# Patient Record
Sex: MALE | Employment: FULL TIME
[De-identification: names, ages, dates, MRNs, and addresses within clinical notes are randomized per-mention and may not be internally consistent; named-entity substitution may affect disease eponyms.]

---

## 2024-07-09 ENCOUNTER — HOSPITAL ENCOUNTER (OUTPATIENT)
Facility: HOSPITAL | Age: 54
Discharge: HOME | End: 2024-07-09
Attending: PODIATRIST
Payer: COMMERCIAL

## 2024-07-09 VITALS
WEIGHT: 231 LBS | BODY MASS INDEX: 31.29 KG/M2 | HEART RATE: 88 BPM | TEMPERATURE: 97.9 F | DIASTOLIC BLOOD PRESSURE: 72 MMHG | SYSTOLIC BLOOD PRESSURE: 153 MMHG | RESPIRATION RATE: 17 BRPM | HEIGHT: 72 IN

## 2024-07-09 DIAGNOSIS — L97.522 ULCER OF LEFT FOOT, WITH FAT LAYER EXPOSED (HCC): Primary | ICD-10-CM

## 2024-07-09 PROCEDURE — 11042 DBRDMT SUBQ TIS 1ST 20SQCM/<: CPT

## 2024-07-09 PROCEDURE — 99203 OFFICE O/P NEW LOW 30 MIN: CPT

## 2024-07-09 RX ORDER — BACITRACIN ZINC AND POLYMYXIN B SULFATE 500; 1000 [USP'U]/G; [USP'U]/G
OINTMENT TOPICAL ONCE
OUTPATIENT
Start: 2024-07-09 | End: 2024-07-09

## 2024-07-09 RX ORDER — GENTAMICIN SULFATE 1 MG/G
OINTMENT TOPICAL ONCE
OUTPATIENT
Start: 2024-07-09 | End: 2024-07-09

## 2024-07-09 RX ORDER — CLOBETASOL PROPIONATE 0.5 MG/G
OINTMENT TOPICAL ONCE
OUTPATIENT
Start: 2024-07-09 | End: 2024-07-09

## 2024-07-09 RX ORDER — LIDOCAINE HYDROCHLORIDE 20 MG/ML
JELLY TOPICAL ONCE
OUTPATIENT
Start: 2024-07-09 | End: 2024-07-09

## 2024-07-09 RX ORDER — IBUPROFEN 200 MG
TABLET ORAL ONCE
OUTPATIENT
Start: 2024-07-09 | End: 2024-07-09

## 2024-07-09 RX ORDER — BETAMETHASONE DIPROPIONATE 0.5 MG/G
CREAM TOPICAL ONCE
OUTPATIENT
Start: 2024-07-09 | End: 2024-07-09

## 2024-07-09 RX ORDER — GLIPIZIDE 10 MG/1
10 TABLET ORAL 2 TIMES DAILY
COMMUNITY

## 2024-07-09 RX ORDER — TESTOSTERONE CYPIONATE 200 MG/ML
INJECTION, SOLUTION INTRAMUSCULAR WEEKLY
COMMUNITY
Start: 2024-05-28

## 2024-07-09 RX ORDER — GINSENG 100 MG
CAPSULE ORAL ONCE
OUTPATIENT
Start: 2024-07-09 | End: 2024-07-09

## 2024-07-09 RX ORDER — SODIUM CHLOR/HYPOCHLOROUS ACID 0.033 %
SOLUTION, IRRIGATION IRRIGATION ONCE
OUTPATIENT
Start: 2024-07-09 | End: 2024-07-09

## 2024-07-09 RX ORDER — INSULIN GLARGINE 100 [IU]/ML
40 INJECTION, SOLUTION SUBCUTANEOUS 2 TIMES DAILY
COMMUNITY

## 2024-07-09 RX ORDER — LIDOCAINE HYDROCHLORIDE 40 MG/ML
SOLUTION TOPICAL ONCE
OUTPATIENT
Start: 2024-07-09 | End: 2024-07-09

## 2024-07-09 RX ORDER — INSULIN LISPRO 100 [IU]/ML
10 INJECTION, SOLUTION INTRAVENOUS; SUBCUTANEOUS 3 TIMES DAILY PRN
COMMUNITY

## 2024-07-09 RX ORDER — TRIAMCINOLONE ACETONIDE 1 MG/G
OINTMENT TOPICAL ONCE
OUTPATIENT
Start: 2024-07-09 | End: 2024-07-09

## 2024-07-09 RX ORDER — LIDOCAINE 50 MG/G
OINTMENT TOPICAL ONCE
OUTPATIENT
Start: 2024-07-09 | End: 2024-07-09

## 2024-07-09 RX ORDER — BLOOD-GLUCOSE SENSOR
EACH MISCELLANEOUS
COMMUNITY
Start: 2024-05-25

## 2024-07-09 RX ORDER — LIDOCAINE 40 MG/G
CREAM TOPICAL ONCE
OUTPATIENT
Start: 2024-07-09 | End: 2024-07-09

## 2024-07-09 RX ORDER — SULFAMETHOXAZOLE AND TRIMETHOPRIM 800; 160 MG/1; MG/1
TABLET ORAL
COMMUNITY
Start: 2024-06-30

## 2024-07-09 NOTE — PLAN OF CARE
New to wound care center, evaluation of wounds to bilateral great toes  Problem: Chronic Conditions and Co-morbidities  Goal: Patient's chronic conditions and co-morbidity symptoms are monitored and maintained or improved  Outcome: Not Progressing     Problem: Cognitive:  Goal: Knowledge of wound care  Description: Knowledge of wound care  Outcome: Not Progressing  Goal: Understands risk factors for wounds  Description: Understands risk factors for wounds  Outcome: Not Progressing     Problem: Wound:  Goal: Will show signs of wound healing; wound closure and no evidence of infection  Description: Will show signs of wound healing; wound closure and no evidence of infection  Outcome: Not Progressing     Problem: Blood Glucose:  Goal: Ability to maintain appropriate glucose levels will improve  Description: Ability to maintain appropriate glucose levels will improve  Outcome: Not Progressing

## 2024-07-09 NOTE — FLOWSHEET NOTE
07/09/24 0823   Anesthetic   Anesthetic 4% Lidocaine Liquid Topical   Right Leg Edema Point of Measurement   Leg circumference 42 cm   Ankle circumference 24.7 cm   Left Leg Edema Point of Measurement   Leg circumference 40.7 cm   Ankle circumference 25 cm   RLE Neurovascular Assessment   Capillary Refill Less than/Equal to 3 seconds   Color Appropriate for Ethnicity   Temperature Warm   R Pedal Pulse +3   LLE Neurovascular Assessment   Capillary Refill Less than/Equal to 3 seconds   Color Appropriate for Ethnicity   Temperature Warm   L Pedal Pulse +3   Ankle-Brachial Index   Right Arm Systolic Pressure 173 mmHg   Left Arm Systolic Pressure 0 mmHg   Right Ankle Systolic Pressure: Posterior Tibial (PT) 172 mmHg   Right Ankle Systolic Pressure: Dorsalis Pedis (DP) 0 mmHg   Left Ankle Systolic Pressure: Posterior Tibial (PT) 178 mmHg   Left Ankle Systolic Pressure: Dorsalis Pedis (DP) 0 mmHg   Right NINA 0.99 mmHg   Left NINA 1.03 mmHg   Overall NINA (Lower NINA) 0.99 mmHg   Wound 07/09/24 Toe (Comment  which one) Right #1 Great Toe   Date First Assessed/Time First Assessed: 07/09/24 0822   Present on Original Admission: Yes  Primary Wound Type: Diabetic Ulcer  Location: Toe (Comment  which one)  Wound Location Orientation: Right  Wound Description (Comments): #1 Great Toe   Wound Image    Wound Etiology Diabetic   Dressing Status Clean;Dry;Intact   Wound Cleansed Cleansed with saline   Wound Length (cm) 2 cm   Wound Width (cm) 2 cm   Wound Depth (cm) 0 cm   Wound Surface Area (cm^2) 4 cm^2   Wound Volume (cm^3) 0 cm^3   Wound Assessment Dry   Drainage Amount None (dry)   Odor None   Terestia-wound Assessment Blanchable erythema;Hyperkeratosis (callous)   Margins Attached edges   Wound 07/09/24 Toe (Comment  which one) Left #2 Great toe   Date First Assessed/Time First Assessed: 07/09/24 0822   Present on Original Admission: Yes  Primary Wound Type: Diabetic Ulcer  Location: Toe (Comment  which one)  Wound Location

## 2024-07-09 NOTE — WOUND CARE
Post-Procedure Width (cm) 2 cm 07/09/24 0900   Post-Procedure Depth (cm) 0.1 cm 07/09/24 0900   Post-Procedure Surface Area (cm^2) 4 cm^2 07/09/24 0900   Post-Procedure Volume (cm^3) 0.4 cm^3 07/09/24 0900   Wound Assessment Dry 07/09/24 0823   Drainage Amount None (dry) 07/09/24 0823   Odor None 07/09/24 0823   Teresita-wound Assessment Blanchable erythema;Hyperkeratosis (callous) 07/09/24 0823   Margins Attached edges 07/09/24 0823   Number of days: 0       Wound 07/09/24 Toe (Comment  which one) Left #2 Great toe (Active)   Wound Image   07/09/24 0823   Wound Etiology Diabetic 07/09/24 0823   Dressing Status Clean;Dry;Intact 07/09/24 0823   Dressing/Treatment Betadine swabs/povidone iodine;Honey gel/honey paste;Foam;Gauze dressing/dressing sponge 07/09/24 0927   Offloading for Diabetic Foot Ulcers Offloading ordered 07/09/24 0927   Wound Length (cm) 0.9 cm 07/09/24 0823   Wound Width (cm) 0.7 cm 07/09/24 0823   Wound Depth (cm) 0.1 cm 07/09/24 0823   Wound Surface Area (cm^2) 0.63 cm^2 07/09/24 0823   Wound Volume (cm^3) 0.063 cm^3 07/09/24 0823   Post-Procedure Length (cm) 0.9 cm 07/09/24 0900   Post-Procedure Width (cm) 0.7 cm 07/09/24 0900   Post-Procedure Depth (cm) 0.2 cm 07/09/24 0900   Post-Procedure Surface Area (cm^2) 0.63 cm^2 07/09/24 0900   Post-Procedure Volume (cm^3) 0.126 cm^3 07/09/24 0900   Wound Assessment Dry 07/09/24 0823   Drainage Amount None (dry) 07/09/24 0823   Odor None 07/09/24 0823   Teresita-wound Assessment Blanchable erythema;Hyperkeratosis (callous) 07/09/24 0823   Margins Flat/open edges 07/09/24 0823   Number of days: 0             Ulcer bed: Granular/Healthy    Periwound: Calloused  Exudate: Moderate amount Serous exudate    Data Review:   No results found for this or any previous visit (from the past 24 hour(s)).    Assessment:     54 y.o. male with toes great toe neuropathic ulcer.  Ulcer R Hallux with fat layer  Ulcer L Hallux with fat layer  DM with foot ulcer      Plan:     Wound

## 2024-07-09 NOTE — FLOWSHEET NOTE
07/09/24 0927   Wound 07/09/24 Toe (Comment  which one) Right #1 Great Toe   Date First Assessed/Time First Assessed: 07/09/24 0822   Present on Original Admission: Yes  Primary Wound Type: Diabetic Ulcer  Location: Toe (Comment  which one)  Wound Location Orientation: Right  Wound Description (Comments): #1 Great Toe   Dressing/Treatment Betadine swabs/povidone iodine;Foam;Gauze dressing/dressing sponge   Offloading for Diabetic Foot Ulcers Offloading ordered   Wound 07/09/24 Toe (Comment  which one) Left #2 Great toe   Date First Assessed/Time First Assessed: 07/09/24 0822   Present on Original Admission: Yes  Primary Wound Type: Diabetic Ulcer  Location: Toe (Comment  which one)  Wound Location Orientation: Left  Wound Description (Comments): #2 Great toe   Dressing/Treatment Betadine swabs/povidone iodine;Honey gel/honey paste;Foam;Gauze dressing/dressing sponge   Offloading for Diabetic Foot Ulcers Offloading ordered     Discharge Condition: Stable    Pain: 0    Ambulatory Status:Walking    Discharge Destination: Home    Transportation:Car    Accompanied by: Self    Discharge instructions reviewed with Self and copy or written instructions have been provided. All questions/concerns have been addressed at this time.

## 2024-07-09 NOTE — PATIENT INSTRUCTIONS
with restrictions:    [] May return to full duty work:                                              Return Appointment:    [x] Return Appointment: With Dr. Dex Mendoza  in  1 Week(s)    [] Nurse visit scheduled in  day(s) or  week(s)       Wound Care Center Information: Should you experience any significant changes in your wound(s) or have questions about your wound care, please contact the [facility] at MONDAY-THURSDAY 8:00 am - 4:30 AND Friday 8:00 AM- 12:00 PM .  If you need help with your wound outside these hours and cannot wait until we are again available, contact your PCP or go to the hospital emergency room.     PLEASE NOTE: IF YOU ARE UNABLE TO OBTAIN WOUND SUPPLIES, CONTINUE TO USE THE SUPPLIES YOU HAVE AVAILABLE UNTIL YOU ARE ABLE TO REACH US. IT IS MOST IMPORTANT TO KEEP THE WOUND COVERED AT ALL TIMES.     Physician Signature:_______________________ Dr. Dex Mendoza

## 2024-07-16 ENCOUNTER — HOSPITAL ENCOUNTER (OUTPATIENT)
Facility: HOSPITAL | Age: 54
Discharge: HOME | End: 2024-07-16
Attending: PODIATRIST
Payer: COMMERCIAL

## 2024-07-16 VITALS
SYSTOLIC BLOOD PRESSURE: 130 MMHG | HEART RATE: 84 BPM | RESPIRATION RATE: 18 BRPM | DIASTOLIC BLOOD PRESSURE: 79 MMHG | TEMPERATURE: 97.9 F

## 2024-07-16 DIAGNOSIS — L97.522 ULCER OF LEFT FOOT, WITH FAT LAYER EXPOSED (HCC): Primary | ICD-10-CM

## 2024-07-16 PROCEDURE — 11042 DBRDMT SUBQ TIS 1ST 20SQCM/<: CPT

## 2024-07-16 RX ORDER — BACITRACIN ZINC AND POLYMYXIN B SULFATE 500; 1000 [USP'U]/G; [USP'U]/G
OINTMENT TOPICAL ONCE
OUTPATIENT
Start: 2024-07-16 | End: 2024-07-16

## 2024-07-16 RX ORDER — BETAMETHASONE DIPROPIONATE 0.5 MG/G
CREAM TOPICAL ONCE
OUTPATIENT
Start: 2024-07-16 | End: 2024-07-16

## 2024-07-16 RX ORDER — LIDOCAINE 50 MG/G
OINTMENT TOPICAL ONCE
OUTPATIENT
Start: 2024-07-16 | End: 2024-07-16

## 2024-07-16 RX ORDER — GINSENG 100 MG
CAPSULE ORAL ONCE
OUTPATIENT
Start: 2024-07-16 | End: 2024-07-16

## 2024-07-16 RX ORDER — GENTAMICIN SULFATE 1 MG/G
OINTMENT TOPICAL ONCE
OUTPATIENT
Start: 2024-07-16 | End: 2024-07-16

## 2024-07-16 RX ORDER — LIDOCAINE HYDROCHLORIDE 40 MG/ML
SOLUTION TOPICAL ONCE
OUTPATIENT
Start: 2024-07-16 | End: 2024-07-16

## 2024-07-16 RX ORDER — IBUPROFEN 200 MG
TABLET ORAL ONCE
OUTPATIENT
Start: 2024-07-16 | End: 2024-07-16

## 2024-07-16 RX ORDER — LIDOCAINE HYDROCHLORIDE 20 MG/ML
JELLY TOPICAL ONCE
OUTPATIENT
Start: 2024-07-16 | End: 2024-07-16

## 2024-07-16 RX ORDER — TRIAMCINOLONE ACETONIDE 1 MG/G
OINTMENT TOPICAL ONCE
OUTPATIENT
Start: 2024-07-16 | End: 2024-07-16

## 2024-07-16 RX ORDER — SODIUM CHLOR/HYPOCHLOROUS ACID 0.033 %
SOLUTION, IRRIGATION IRRIGATION ONCE
OUTPATIENT
Start: 2024-07-16 | End: 2024-07-16

## 2024-07-16 RX ORDER — CLOBETASOL PROPIONATE 0.5 MG/G
OINTMENT TOPICAL ONCE
OUTPATIENT
Start: 2024-07-16 | End: 2024-07-16

## 2024-07-16 RX ORDER — LIDOCAINE 40 MG/G
CREAM TOPICAL ONCE
OUTPATIENT
Start: 2024-07-16 | End: 2024-07-16

## 2024-07-16 NOTE — PATIENT INSTRUCTIONS
Discharge Instructions for  [facility]  [address] [place] [address]  Telephone: [phone]   FAX [phone]    NAME:  Homer Zheng  YOB: 1970  ACCOUNT NUMBER :  [id number]  DATE:  7/16/2024     : LETY NOWAK RN     Wound Cleansing:   Do not scrub or use excessive force.  Cleanse wound prior to applying a clean dressing with:      [] Cleanse wound with: BABY SHAMPOO LATHER  LEAVE on 1-2 MINUTES ON WOUND THEN RINSE WITH WATER OR SALINE    [] May Shower at Discharge     [x] Shower on days of dressing change, remove dressing first    [] Keep Wound Dry in Shower (may purchase a cast cover if needed)     Topical Treatments:  Do not apply lotions, creams, or ointments to wound bed unless directed.     [] Apply moisturizing lotion A&D ointment  to skin surrounding the wound prior to dressing change.  [] Apply antifungal ointment to skin surrounding the wound prior to dressing change.  [] Apply thin film of Zinc barrier ointment to skin immediately around wound.       Dressings:           Wound Location Bilateral great toes   Apply Primary Dressing:       [x]  Left great toe-betadine, cutout foam donut, gauze, tape  Right great toe-betadine to doc wound, medi-honey, cutout foam donut, gauze, tape     Change dressing:   [x] Daily or every other day, coordinate with showers         Off-Loading: [x] Surgical shoe    [] Podus Boot(s)   [] Foam Boot(s)  [] Knee scooter [] Cast Boot [] CROW Boot  [] Other:    [] Total non-weight bearing : [] Right Leg     [] Left Leg    [x] Off-loading when : [x] walking  [] in bed [] Sitting        Dietary:  [x] Increase Protein: examples ( Meat, cheese, eggs, greek yogurt, premier protein drink, fish, nuts )   [] Reji  [] Protien drink supplement   [] Recommended Supplements :      Activity:  Activity as tolerated:    [x] Patient has no activity restrictions       [] Strict Bedrest:   [] Remain off Work:     [] Return to work

## 2024-07-16 NOTE — FLOWSHEET NOTE
07/16/24 0909   Wound 07/09/24 Toe (Comment  which one) Right #1 Great Toe   Date First Assessed/Time First Assessed: 07/09/24 0822   Present on Original Admission: Yes  Primary Wound Type: Diabetic Ulcer  Location: Toe (Comment  which one)  Wound Location Orientation: Right  Wound Description (Comments): #1 Great Toe   Dressing/Treatment Honey gel/honey paste;Betadine swabs/povidone iodine;Foam;Gauze dressing/dressing sponge;Tape/Soft cloth adhesive tape   Offloading for Diabetic Foot Ulcers Offloading ordered   Wound 07/09/24 Toe (Comment  which one) Left #2 Great toe   Date First Assessed/Time First Assessed: 07/09/24 0822   Present on Original Admission: Yes  Primary Wound Type: Diabetic Ulcer  Location: Toe (Comment  which one)  Wound Location Orientation: Left  Wound Description (Comments): #2 Great toe   Dressing/Treatment Betadine swabs/povidone iodine;Gauze dressing/dressing sponge;Foam;Tape/Soft cloth adhesive tape   Offloading for Diabetic Foot Ulcers Offloading ordered     Discharge Condition: Stable    Pain: 0    Ambulatory Status:Walking    Discharge Destination: Home    Transportation:Car    Accompanied by: Self    Discharge instructions reviewed with Self and copy or written instructions have been provided. All questions/concerns have been addressed at this time.

## 2024-07-16 NOTE — FLOWSHEET NOTE
07/16/24 0826   Anesthetic   Anesthetic 4% Lidocaine Liquid Topical   Right Leg Edema Point of Measurement   Leg circumference 42.5 cm   Ankle circumference 24.5 cm   Left Leg Edema Point of Measurement   Leg circumference 42.8 cm   Ankle circumference 25 cm   RLE Neurovascular Assessment   Capillary Refill Less than/Equal to 3 seconds   Color Appropriate for Ethnicity   Temperature Warm   R Pedal Pulse +3   LLE Neurovascular Assessment   Capillary Refill Less than/Equal to 3 seconds   Color Appropriate for Ethnicity   Temperature Warm   L Pedal Pulse +2   Wound 07/09/24 Toe (Comment  which one) Right #1 Great Toe   Date First Assessed/Time First Assessed: 07/09/24 0822   Present on Original Admission: Yes  Primary Wound Type: Diabetic Ulcer  Location: Toe (Comment  which one)  Wound Location Orientation: Right  Wound Description (Comments): #1 Great Toe   Wound Image    Wound Cleansed Cleansed with saline   Wound Length (cm) 0.5 cm   Wound Width (cm) 0.2 cm   Wound Depth (cm) 0.1 cm   Wound Surface Area (cm^2) 0.1 cm^2   Change in Wound Size % (l*w) 97.5   Wound Volume (cm^3) 0.01 cm^3   Wound Assessment Pink/red   Drainage Amount Small (< 25%)   Drainage Description Serous   Odor None   Teresita-wound Assessment Hyperkeratosis (callous)   Margins Flat/open edges   Wound Thickness Description not for Pressure Injury Full thickness   Wound 07/09/24 Toe (Comment  which one) Left #2 Great toe   Date First Assessed/Time First Assessed: 07/09/24 0822   Present on Original Admission: Yes  Primary Wound Type: Diabetic Ulcer  Location: Toe (Comment  which one)  Wound Location Orientation: Left  Wound Description (Comments): #2 Great toe   Wound Image    Wound Cleansed Cleansed with saline   Wound Length (cm) 0.1 cm   Wound Width (cm) 0.1 cm   Wound Depth (cm) 0.1 cm   Wound Surface Area (cm^2) 0.01 cm^2   Change in Wound Size % (l*w) 98.41   Wound Volume (cm^3) 0.001 cm^3   Wound Healing % 98   Wound Assessment Dry

## 2024-07-16 NOTE — WOUND CARE
Wound Center  Progress Note    Subjective:   Patient is for follow up of LE ulcer(s).     Patient is doing well.  No concerns are reported.  No difficulty or problems with wound care.  Wound care is being performed by staff/pt and consists of dressing changes and offloading wound(s).  No complaints of wound pain.    There have been no changes in patient's medical history in the interim.    ROS:  No fever or chills. No rash. No pain at site of wound    Objective:   General: NAD  Psych: Cooperative, no anxiety or depression  Neuro: Alert, oriented to person/place/situation. Otherwise nonfocal.  Extremities: Bilateral absent pitting edema is noted.    Skin color is normal.   Vascular exam:  No gross changes in pedal pulses.   Capillary refill is intact, <3sec.  Dermatologic:  Skin color appears normal for patient.  Skin turgor is normal. Dystrophic nails are seen on the feet bilaterally.    Ulcer Description:   Measurement: in cm pre/post debridement:  same as pre with inc depth by 0.1 cm    Wound 07/09/24 Toe (Comment  which one) Right #1 Great Toe (Active)   Wound Image   07/16/24 0826   Wound Etiology Diabetic 07/09/24 0823   Dressing Status Clean;Dry;Intact 07/09/24 0823   Wound Cleansed Cleansed with saline 07/16/24 0826   Dressing/Treatment Honey gel/honey paste;Betadine swabs/povidone iodine;Foam;Gauze dressing/dressing sponge;Tape/Soft cloth adhesive tape 07/16/24 0909   Offloading for Diabetic Foot Ulcers Offloading ordered 07/16/24 0909   Wound Length (cm) 0.5 cm 07/16/24 0826   Wound Width (cm) 0.2 cm 07/16/24 0826   Wound Depth (cm) 0.1 cm 07/16/24 0826   Wound Surface Area (cm^2) 0.1 cm^2 07/16/24 0826   Change in Wound Size % (l*w) 97.5 07/16/24 0826   Wound Volume (cm^3) 0.01 cm^3 07/16/24 0826   Post-Procedure Length (cm) 0.5 cm 07/16/24 0855   Post-Procedure Width (cm) 0.2 cm 07/16/24 0855   Post-Procedure Depth (cm) 0.2 cm 07/16/24 0855   Post-Procedure Surface Area (cm^2) 0.1 cm^2 07/16/24 0855

## 2024-07-30 ENCOUNTER — HOSPITAL ENCOUNTER (OUTPATIENT)
Facility: HOSPITAL | Age: 54
Discharge: HOME | End: 2024-07-30
Attending: PODIATRIST
Payer: COMMERCIAL

## 2024-07-30 VITALS
RESPIRATION RATE: 16 BRPM | TEMPERATURE: 98 F | DIASTOLIC BLOOD PRESSURE: 80 MMHG | HEART RATE: 88 BPM | SYSTOLIC BLOOD PRESSURE: 123 MMHG

## 2024-07-30 DIAGNOSIS — L97.522 ULCER OF LEFT FOOT, WITH FAT LAYER EXPOSED (HCC): Primary | ICD-10-CM

## 2024-07-30 PROCEDURE — 11042 DBRDMT SUBQ TIS 1ST 20SQCM/<: CPT

## 2024-07-30 RX ORDER — TRIAMCINOLONE ACETONIDE 1 MG/G
OINTMENT TOPICAL ONCE
OUTPATIENT
Start: 2024-07-30 | End: 2024-07-30

## 2024-07-30 RX ORDER — GINSENG 100 MG
CAPSULE ORAL ONCE
OUTPATIENT
Start: 2024-07-30 | End: 2024-07-30

## 2024-07-30 RX ORDER — BACITRACIN ZINC AND POLYMYXIN B SULFATE 500; 1000 [USP'U]/G; [USP'U]/G
OINTMENT TOPICAL ONCE
OUTPATIENT
Start: 2024-07-30 | End: 2024-07-30

## 2024-07-30 RX ORDER — CLOBETASOL PROPIONATE 0.5 MG/G
OINTMENT TOPICAL ONCE
OUTPATIENT
Start: 2024-07-30 | End: 2024-07-30

## 2024-07-30 RX ORDER — LIDOCAINE 40 MG/G
CREAM TOPICAL ONCE
OUTPATIENT
Start: 2024-07-30 | End: 2024-07-30

## 2024-07-30 RX ORDER — LIDOCAINE HYDROCHLORIDE 40 MG/ML
SOLUTION TOPICAL ONCE
OUTPATIENT
Start: 2024-07-30 | End: 2024-07-30

## 2024-07-30 RX ORDER — SODIUM CHLOR/HYPOCHLOROUS ACID 0.033 %
SOLUTION, IRRIGATION IRRIGATION ONCE
OUTPATIENT
Start: 2024-07-30 | End: 2024-07-30

## 2024-07-30 RX ORDER — IBUPROFEN 200 MG
TABLET ORAL ONCE
OUTPATIENT
Start: 2024-07-30 | End: 2024-07-30

## 2024-07-30 RX ORDER — BETAMETHASONE DIPROPIONATE 0.5 MG/G
CREAM TOPICAL ONCE
OUTPATIENT
Start: 2024-07-30 | End: 2024-07-30

## 2024-07-30 RX ORDER — LIDOCAINE HYDROCHLORIDE 20 MG/ML
JELLY TOPICAL ONCE
OUTPATIENT
Start: 2024-07-30 | End: 2024-07-30

## 2024-07-30 RX ORDER — GENTAMICIN SULFATE 1 MG/G
OINTMENT TOPICAL ONCE
OUTPATIENT
Start: 2024-07-30 | End: 2024-07-30

## 2024-07-30 RX ORDER — LIDOCAINE 50 MG/G
OINTMENT TOPICAL ONCE
OUTPATIENT
Start: 2024-07-30 | End: 2024-07-30

## 2024-07-30 NOTE — FLOWSHEET NOTE
07/30/24 0904   Wound 07/09/24 Toe (Comment  which one) Right #1 Great Toe   Date First Assessed/Time First Assessed: 07/09/24 0822   Present on Original Admission: Yes  Primary Wound Type: Diabetic Ulcer  Location: Toe (Comment  which one)  Wound Location Orientation: Right  Wound Description (Comments): #1 Great Toe   Dressing/Treatment Honey gel/honey paste;Gauze dressing/dressing sponge;Foam;Tape/Soft cloth adhesive tape   Offloading for Diabetic Foot Ulcers Offloading ordered   Wound 07/09/24 Toe (Comment  which one) Left #2 Great toe   Date First Assessed/Time First Assessed: 07/09/24 0822   Present on Original Admission: Yes  Primary Wound Type: Diabetic Ulcer  Location: Toe (Comment  which one)  Wound Location Orientation: Left  Wound Description (Comments): #2 Great toe   Dressing/Treatment Honey gel/honey paste;Gauze dressing/dressing sponge;Foam;Tape/Soft cloth adhesive tape   Offloading for Diabetic Foot Ulcers Offloading ordered     Discharge Condition: Stable    Pain: 0    Ambulatory Status:Walking    Discharge Destination: Home    Transportation:Car    Accompanied by: Self    Discharge instructions reviewed with Self and copy or written instructions have been provided. All questions/concerns have been addressed at this time.     
  Wound Assessment Dry   Drainage Amount None (dry)   Odor None   Teresita-wound Assessment Hyperpigmented   Margins Attached edges     /80   Pulse 88   Temp 98 °F (36.7 °C) (Temporal)   Resp 16

## 2024-07-30 NOTE — PATIENT INSTRUCTIONS
Discharge Instructions for  [facility]  [address] [place] [address]  Telephone: [phone]   FAX [phone]    NAME:  Homer Zheng  YOB: 1970  ACCOUNT NUMBER :  [id number]  DATE:  7/30/2024     : LETY NOWAK RN     Wound Cleansing:   Do not scrub or use excessive force.  Cleanse wound prior to applying a clean dressing with:      [] Cleanse wound with: BABY SHAMPOO LATHER  LEAVE on 1-2 MINUTES ON WOUND THEN RINSE WITH WATER OR SALINE    [] May Shower at Discharge     [x] Shower on days of dressing change, remove dressing first    [] Keep Wound Dry in Shower (may purchase a cast cover if needed)     Topical Treatments:  Do not apply lotions, creams, or ointments to wound bed unless directed.     [] Apply moisturizing lotion A&D ointment  to skin surrounding the wound prior to dressing change.  [] Apply antifungal ointment to skin surrounding the wound prior to dressing change.  [] Apply thin film of Zinc barrier ointment to skin immediately around wound.       Dressings:           Wound Location Bilateral great toes   Apply Primary Dressing:       [x]  Left great toe-gentian violet, cutout foam donut, gauze, tape  Right great toe-gentian violet to doc wound, medi-honey, cutout foam donut, gauze, tape     Change dressing:   [x] Daily or every other day, coordinate with showers         Off-Loading: [x] Surgical shoe    [] Podus Boot(s)   [] Foam Boot(s)  [] Knee scooter [] Cast Boot [] CROW Boot  [] Other:    [] Total non-weight bearing : [] Right Leg     [] Left Leg    [x] Off-loading when : [x] walking  [] in bed [] Sitting      Dietary:  [x] Increase Protein: examples ( Meat, cheese, eggs, greek yogurt, premier protein drink, fish, nuts )   [] Reji  [] Protien drink supplement   [] Recommended Supplements :      Activity:  Activity as tolerated:    [x] Patient has no activity restrictions       [] Strict Bedrest:   [] Remain off Work:     []

## 2024-07-30 NOTE — WOUND CARE
Wound Center  Progress Note    Subjective:   Patient is for follow up of LE ulcer(s).     Patient is doing well.  No concerns are reported.  No difficulty or problems with wound care.  Wound care is being performed by staff/pt and consists of dressing changes and offloading wound(s).  No complaints of wound pain.    There have been no changes in patient's medical history in the interim.    ROS:  No fever or chills. No rash. No pain at site of wound    Objective:   General: NAD  Psych: Cooperative, no anxiety or depression  Neuro: Alert, oriented to person/place/situation. Otherwise nonfocal.  Extremities: Bilateral absent pitting edema is noted.    Skin color is normal.   Vascular exam:  No gross changes in pedal pulses.   Capillary refill is intact, <3sec.  Dermatologic:  Skin color appears normal for patient.  Skin turgor is normal. Dystrophic nails are seen on the feet bilaterally.    Ulcer Description:   Measurement: in cm pre/post debridement:  same as pre with inc depth by 0.1 cm    Wound 07/09/24 Toe (Comment  which one) Right #1 Great Toe (Active)   Wound Image   07/30/24 0837   Wound Etiology Diabetic 07/30/24 0837   Dressing Status Clean;Dry;Intact 07/30/24 0837   Wound Cleansed Cleansed with saline 07/30/24 0837   Dressing/Treatment Honey gel/honey paste;Gauze dressing/dressing sponge;Foam;Tape/Soft cloth adhesive tape 07/30/24 0904   Offloading for Diabetic Foot Ulcers Offloading ordered 07/30/24 0904   Wound Length (cm) 0.6 cm 07/30/24 0837   Wound Width (cm) 0.4 cm 07/30/24 0837   Wound Depth (cm) 0.1 cm 07/30/24 0837   Wound Surface Area (cm^2) 0.24 cm^2 07/30/24 0837   Change in Wound Size % (l*w) 94 07/30/24 0837   Wound Volume (cm^3) 0.024 cm^3 07/30/24 0837   Post-Procedure Length (cm) 0.6 cm 07/30/24 0848   Post-Procedure Width (cm) 0.4 cm 07/30/24 0848   Post-Procedure Depth (cm) 0.2 cm 07/30/24 0848   Post-Procedure Surface Area (cm^2) 0.24 cm^2 07/30/24 0848   Post-Procedure Volume (cm^3)

## 2024-08-05 ENCOUNTER — TELEPHONE (OUTPATIENT)
Facility: HOSPITAL | Age: 54
End: 2024-08-05

## 2024-08-05 NOTE — TELEPHONE ENCOUNTER
Patient called office today and LVM stating he will need to reschedule his appointment and that he has infection in Left great toe and would like a antibiotic to be called in. Spoke with Lashell HENDERSON RN and provider would need to evaluate toe and possibly culture in check for infection before prescribing abx, patient can go to urgent care if he feels toe is infected. Informed patient of this and patient states he will go to urgent care but upset we will not prescribe antibiotics without seeing him.     Patient also stated he left a message over the weekend and has not received call back informed the patient there was no message from him, patient stated he LVM at the number on Dr. Mendoza's business card. Explain to patient that number is to him podiatry office and not our office. Patient cancelled appointment for 08/06/2024 and will call back if he decides to return to office.